# Patient Record
(demographics unavailable — no encounter records)

---

## 2025-07-10 NOTE — HISTORY OF PRESENT ILLNESS
[FreeTextEntry8] : Pt is c/o urinary urgency for about 5 days. Pt has had three episodes of kidney stones in the past.

## 2025-07-10 NOTE — PLAN
[FreeTextEntry1] : Renal Ultrasound check labs and urine culture Pt will make an appointment to see her Urologist, Dr. Johnson

## 2025-07-10 NOTE — ASSESSMENT
[FreeTextEntry1] : Pt s c/o urinary urgency for several days. Urine dipstick done in the office today is negative. Possible recurrence of kidney stones.